# Patient Record
Sex: FEMALE | Race: OTHER
[De-identification: names, ages, dates, MRNs, and addresses within clinical notes are randomized per-mention and may not be internally consistent; named-entity substitution may affect disease eponyms.]

---

## 2021-08-04 ENCOUNTER — HOSPITAL ENCOUNTER (OUTPATIENT)
Dept: HOSPITAL 53 - M RADPRO | Age: 36
End: 2021-08-04
Attending: GENERAL PRACTICE
Payer: COMMERCIAL

## 2021-08-04 DIAGNOSIS — N97.9: Primary | ICD-10-CM

## 2021-08-04 PROCEDURE — 74740 X-RAY FEMALE GENITAL TRACT: CPT

## 2021-08-04 PROCEDURE — 58340 CATHETER FOR HYSTEROGRAPHY: CPT

## 2021-08-04 NOTE — REP
INDICATION:

INFERTILITY.



COMPARISON:

None.



TECHNIQUE:

The referring clinician catheterized the cervix and injected contrast, I performed

fluoroscopy.



FINDINGS:

The uterine cavity opacifies well with contrast with no persistent filling defect or

contour abnormality.  Both fallopian tubes opacify well and are not significantly

dilated.  There is free intraperitoneal spillage bilaterally indicating bilateral

fallopian tube patency.



IMPRESSION:

Bilateral fallopian tube patency as discussed above.



Fluoroscopy time 0.6 minutes.





<Electronically signed by Rui Casanova > 08/04/21 1226